# Patient Record
Sex: FEMALE | Race: AMERICAN INDIAN OR ALASKA NATIVE
[De-identification: names, ages, dates, MRNs, and addresses within clinical notes are randomized per-mention and may not be internally consistent; named-entity substitution may affect disease eponyms.]

---

## 2018-07-29 ENCOUNTER — HOSPITAL ENCOUNTER (EMERGENCY)
Dept: HOSPITAL 5 - ED | Age: 43
Discharge: HOME | End: 2018-07-29
Payer: MEDICAID

## 2018-07-29 VITALS — DIASTOLIC BLOOD PRESSURE: 80 MMHG | SYSTOLIC BLOOD PRESSURE: 170 MMHG

## 2018-07-29 DIAGNOSIS — M54.2: ICD-10-CM

## 2018-07-29 DIAGNOSIS — E11.9: ICD-10-CM

## 2018-07-29 DIAGNOSIS — S20.229A: ICD-10-CM
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
## 2018-07-29 DIAGNOSIS — I10: ICD-10-CM

## 2018-07-29 DIAGNOSIS — S06.0X1A: Primary | ICD-10-CM
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
## 2018-07-29 DIAGNOSIS — M54.89: ICD-10-CM
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
## 2018-07-29 DIAGNOSIS — Z88.8: ICD-10-CM

## 2018-07-29 PROCEDURE — 70450 CT HEAD/BRAIN W/O DYE: CPT

## 2018-07-29 PROCEDURE — 82962 GLUCOSE BLOOD TEST: CPT

## 2018-07-29 PROCEDURE — 72125 CT NECK SPINE W/O DYE: CPT

## 2018-07-29 PROCEDURE — 72070 X-RAY EXAM THORAC SPINE 2VWS: CPT

## 2018-07-29 PROCEDURE — 96374 THER/PROPH/DIAG INJ IV PUSH: CPT

## 2018-07-29 PROCEDURE — 72100 X-RAY EXAM L-S SPINE 2/3 VWS: CPT
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
## 2023-08-24 ENCOUNTER — OFFICE VISIT (OUTPATIENT)
Dept: URBAN - METROPOLITAN AREA CLINIC 92 | Facility: CLINIC | Age: 48
End: 2023-08-24
Payer: MEDICARE

## 2023-08-24 ENCOUNTER — WEB ENCOUNTER (OUTPATIENT)
Dept: URBAN - METROPOLITAN AREA CLINIC 92 | Facility: CLINIC | Age: 48
End: 2023-08-24

## 2023-08-24 ENCOUNTER — DASHBOARD ENCOUNTERS (OUTPATIENT)
Age: 48
End: 2023-08-24

## 2023-08-24 ENCOUNTER — LAB OUTSIDE AN ENCOUNTER (OUTPATIENT)
Dept: URBAN - METROPOLITAN AREA CLINIC 92 | Facility: CLINIC | Age: 48
End: 2023-08-24

## 2023-08-24 VITALS
HEIGHT: 70 IN | HEART RATE: 104 BPM | DIASTOLIC BLOOD PRESSURE: 87 MMHG | TEMPERATURE: 97 F | WEIGHT: 240 LBS | BODY MASS INDEX: 34.36 KG/M2 | SYSTOLIC BLOOD PRESSURE: 120 MMHG

## 2023-08-24 DIAGNOSIS — I63.9 CEREBROVASCULAR ACCIDENT (CVA), UNSPECIFIED MECHANISM: ICD-10-CM

## 2023-08-24 DIAGNOSIS — Z83.71 FAMILY HISTORY OF COLON POLYPS: ICD-10-CM

## 2023-08-24 DIAGNOSIS — N18.6 END STAGE RENAL DISEASE ON DIALYSIS: ICD-10-CM

## 2023-08-24 DIAGNOSIS — Z12.11 COLON CANCER SCREENING: ICD-10-CM

## 2023-08-24 PROBLEM — 230690007: Status: ACTIVE | Noted: 2023-08-24

## 2023-08-24 PROCEDURE — 99204 OFFICE O/P NEW MOD 45 MIN: CPT | Performed by: INTERNAL MEDICINE

## 2023-08-24 RX ORDER — CARVEDILOL 6.25 MG/1
TAKE ONE TABLET BY MOUTH TWICE A DAY IN THE MORNING AND EVERY EVENING TABLET, FILM COATED ORAL
Qty: 30 UNSPECIFIED | Refills: 1 | COMMUNITY

## 2023-08-24 RX ORDER — SEMAGLUTIDE 0.68 MG/ML
INJECT 0.25MG UNDER THE SKIN EVERY WEEK INJECTION, SOLUTION SUBCUTANEOUS
Qty: 3 UNSPECIFIED | Refills: 0 | Status: ACTIVE | COMMUNITY

## 2023-08-24 RX ORDER — SEVELAMER HYDROCHLORIDE 800 MG/1
TABLET ORAL
Qty: 21 EACH | Refills: 1 | Status: ACTIVE | COMMUNITY

## 2023-08-24 RX ORDER — INSULIN GLARGINE 100 [IU]/ML
INJECT 60 UNITS UNDER THE SKIN EVERY EVENING INJECTION, SOLUTION SUBCUTANEOUS
Qty: 30 UNSPECIFIED | Refills: 0 | Status: ACTIVE | COMMUNITY

## 2023-08-24 RX ORDER — INSULIN ASPART 100 [IU]/ML
INJECT 33 UNITS UNDER THE SKIN THREE TIMES A DAY BEFORE MEALS INJECTION, SOLUTION INTRAVENOUS; SUBCUTANEOUS
Qty: 30 UNSPECIFIED | Refills: 1 | Status: ACTIVE | COMMUNITY

## 2023-08-24 RX ORDER — SUCROFERRIC OXYHYDROXIDE 500 MG/1
CHEW TWO TABLETS BY MOUTH TWICE A DAY WITH A MEAL TABLET, CHEWABLE ORAL
Qty: 120 UNSPECIFIED | Refills: 0 | Status: ACTIVE | COMMUNITY

## 2023-08-24 RX ORDER — GABAPENTIN 300 MG/1
CAPSULE ORAL
Qty: 90 CAPSULE | Status: ACTIVE | COMMUNITY

## 2023-08-24 RX ORDER — APIXABAN 5 MG/1
TAKE ONE TABLET BY MOUTH ONE TIME DAILY IN THE MORNING AND 1 ONE TIME DAILY AT BEDTIME TABLET, FILM COATED ORAL
Qty: 59 UNSPECIFIED | Refills: 1 | Status: ACTIVE | COMMUNITY

## 2023-08-24 RX ORDER — AMLODIPINE BESYLATE 10 MG/1
TAKE ONE TABLET BY MOUTH ONE TIME DAILY FOR BLOOD PRESSURE TABLET ORAL
Qty: 90 UNSPECIFIED | Refills: 0 | Status: ACTIVE | COMMUNITY

## 2023-08-24 NOTE — HPI-TODAY'S VISIT:
This is a 47 yo referred from [facility] for a colonoscopy as a part of kidney transplant evaluation.  A copy of this document will be sent to the referring provider. The patient denies abdominal pain, weight loss, rectal bleeding, constipation, diarrhea, and a change in bowel habits.  There is no family history of colon cancer or colon polyps.  Patient is on dialysis MWF.  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

## 2023-08-24 NOTE — PHYSICAL EXAM RECTAL:
normal tone, no external hemorrhoids, no masses palpable, no red blood, Tenderness on LINK, Internal hemorrhoids present